# Patient Record
Sex: FEMALE | Race: WHITE | Employment: FULL TIME | URBAN - NONMETROPOLITAN AREA
[De-identification: names, ages, dates, MRNs, and addresses within clinical notes are randomized per-mention and may not be internally consistent; named-entity substitution may affect disease eponyms.]

---

## 2020-06-03 ENCOUNTER — HOSPITAL ENCOUNTER (EMERGENCY)
Age: 30
Discharge: HOME | End: 2020-06-03
Payer: MEDICAID

## 2020-06-03 VITALS
HEART RATE: 106 BPM | HEIGHT: 63 IN | OXYGEN SATURATION: 97 % | WEIGHT: 217 LBS | SYSTOLIC BLOOD PRESSURE: 120 MMHG | TEMPERATURE: 97.8 F | DIASTOLIC BLOOD PRESSURE: 73 MMHG | RESPIRATION RATE: 16 BRPM | BODY MASS INDEX: 38.45 KG/M2

## 2020-06-03 PROCEDURE — 99213 OFFICE O/P EST LOW 20 MIN: CPT

## 2020-06-03 PROCEDURE — 99213 OFFICE O/P EST LOW 20 MIN: CPT | Performed by: NURSE PRACTITIONER

## 2020-06-03 PROCEDURE — 6370000000 HC RX 637 (ALT 250 FOR IP): Performed by: NURSE PRACTITIONER

## 2020-06-03 RX ORDER — ONDANSETRON 4 MG/1
4 TABLET, ORALLY DISINTEGRATING ORAL EVERY 8 HOURS PRN
Qty: 15 TABLET | Refills: 0 | Status: SHIPPED | OUTPATIENT
Start: 2020-06-03

## 2020-06-03 RX ORDER — ONDANSETRON 4 MG/1
4 TABLET, ORALLY DISINTEGRATING ORAL ONCE
Status: COMPLETED | OUTPATIENT
Start: 2020-06-03 | End: 2020-06-03

## 2020-06-03 RX ADMIN — ONDANSETRON 4 MG: 4 TABLET, ORALLY DISINTEGRATING ORAL at 14:29

## 2020-06-03 ASSESSMENT — ENCOUNTER SYMPTOMS
NAUSEA: 1
ABDOMINAL PAIN: 0
BLOOD IN STOOL: 0
ANAL BLEEDING: 0
COUGH: 0
ABDOMINAL DISTENTION: 0
CONSTIPATION: 0
RHINORRHEA: 0
CHEST TIGHTNESS: 0
DIARRHEA: 1
APNEA: 0
STRIDOR: 0
VOMITING: 0
SINUS PAIN: 0
SORE THROAT: 0
WHEEZING: 0
CHOKING: 0
RECTAL PAIN: 0
SHORTNESS OF BREATH: 0
SINUS PRESSURE: 0

## 2020-06-04 ENCOUNTER — CARE COORDINATION (OUTPATIENT)
Dept: CARE COORDINATION | Age: 30
End: 2020-06-04

## 2020-06-05 NOTE — CARE COORDINATION
Author provided contact information for future reference. Patient/family/caregiver given information for [employer] and agrees to enroll yes  Patient's preferred e-mail: [email or web address]   Patient's preferred phone number: [phone]  Based on Loop alert triggers, patient will be contacted by nurse care manager for worsening symptoms. Spoke with pt on phone. Pt reports she is \"better. \" She filled Rx and is taking as prescribed. Educated pt on Covid Precautions and pt is compliant. 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 \"  Pt was provided the Call Center #. Introduced LOOP an dpt agreed. Encouraged pt to contact her PCP with any new or worsening sx. Pt voiced understanding and was appreciative of the f/u call. Will resolve at this time.

## 2022-03-21 ENCOUNTER — HOSPITAL ENCOUNTER (EMERGENCY)
Age: 32
Discharge: HOME | End: 2022-03-21
Attending: EMERGENCY MEDICINE
Payer: MEDICARE

## 2022-03-21 VITALS
HEIGHT: 62 IN | DIASTOLIC BLOOD PRESSURE: 83 MMHG | RESPIRATION RATE: 18 BRPM | OXYGEN SATURATION: 98 % | SYSTOLIC BLOOD PRESSURE: 120 MMHG | BODY MASS INDEX: 38.64 KG/M2 | WEIGHT: 210 LBS | TEMPERATURE: 98.1 F | HEART RATE: 101 BPM

## 2022-03-21 DIAGNOSIS — T30.0 ERYTHEMA DUE TO BURN (FIRST DEGREE): Primary | ICD-10-CM

## 2022-03-21 PROCEDURE — 90714 TD VACC NO PRESV 7 YRS+ IM: CPT | Performed by: EMERGENCY MEDICINE

## 2022-03-21 PROCEDURE — 6370000000 HC RX 637 (ALT 250 FOR IP): Performed by: EMERGENCY MEDICINE

## 2022-03-21 PROCEDURE — 99283 EMERGENCY DEPT VISIT LOW MDM: CPT

## 2022-03-21 PROCEDURE — 90471 IMMUNIZATION ADMIN: CPT | Performed by: EMERGENCY MEDICINE

## 2022-03-21 PROCEDURE — 6360000002 HC RX W HCPCS: Performed by: EMERGENCY MEDICINE

## 2022-03-21 PROCEDURE — 16000 INITIAL TREATMENT OF BURN(S): CPT

## 2022-03-21 RX ORDER — HYDROCODONE BITARTRATE AND ACETAMINOPHEN 5; 325 MG/1; MG/1
1 TABLET ORAL ONCE
Status: COMPLETED | OUTPATIENT
Start: 2022-03-21 | End: 2022-03-21

## 2022-03-21 RX ORDER — CEPHALEXIN 500 MG/1
500 CAPSULE ORAL 4 TIMES DAILY
Qty: 40 CAPSULE | Refills: 0 | Status: SHIPPED | OUTPATIENT
Start: 2022-03-21 | End: 2022-03-31

## 2022-03-21 RX ORDER — CEPHALEXIN 250 MG/1
500 CAPSULE ORAL ONCE
Status: COMPLETED | OUTPATIENT
Start: 2022-03-21 | End: 2022-03-21

## 2022-03-21 RX ORDER — GINSENG 100 MG
CAPSULE ORAL 3 TIMES DAILY
Status: DISCONTINUED | OUTPATIENT
Start: 2022-03-21 | End: 2022-03-22 | Stop reason: HOSPADM

## 2022-03-21 RX ORDER — HYDROCODONE BITARTRATE AND ACETAMINOPHEN 5; 325 MG/1; MG/1
1 TABLET ORAL EVERY 6 HOURS PRN
Qty: 12 TABLET | Refills: 0 | Status: SHIPPED | OUTPATIENT
Start: 2022-03-21 | End: 2022-03-24

## 2022-03-21 RX ADMIN — HYDROCODONE BITARTRATE AND ACETAMINOPHEN 1 TABLET: 5; 325 TABLET ORAL at 22:19

## 2022-03-21 RX ADMIN — CLOSTRIDIUM TETANI TOXOID ANTIGEN (FORMALDEHYDE INACTIVATED) AND CORYNEBACTERIUM DIPHTHERIAE TOXOID ANTIGEN (FORMALDEHYDE INACTIVATED) 0.5 ML: 5; 2 INJECTION, SUSPENSION INTRAMUSCULAR at 22:20

## 2022-03-21 RX ADMIN — CEPHALEXIN 500 MG: 250 CAPSULE ORAL at 22:19

## 2022-03-21 RX ADMIN — BACITRACIN: 500 OINTMENT TOPICAL at 22:20

## 2022-03-21 ASSESSMENT — ENCOUNTER SYMPTOMS
RHINORRHEA: 0
CONSTIPATION: 0
SINUS PRESSURE: 0
VOICE CHANGE: 0
ABDOMINAL DISTENTION: 0
NAUSEA: 0
WHEEZING: 0
EYE DISCHARGE: 0
EYE ITCHING: 0
CHOKING: 0
VOMITING: 0
TROUBLE SWALLOWING: 0
SHORTNESS OF BREATH: 0
PHOTOPHOBIA: 0
EYE PAIN: 0
EYE REDNESS: 0
CHEST TIGHTNESS: 0
SORE THROAT: 0
ABDOMINAL PAIN: 0
BLOOD IN STOOL: 0
BACK PAIN: 0
DIARRHEA: 0
COUGH: 0

## 2022-03-21 ASSESSMENT — PAIN SCALES - GENERAL: PAINLEVEL_OUTOF10: 10

## 2022-03-21 ASSESSMENT — PAIN DESCRIPTION - DESCRIPTORS: DESCRIPTORS: THROBBING

## 2022-03-21 ASSESSMENT — PAIN DESCRIPTION - ORIENTATION: ORIENTATION: RIGHT

## 2022-03-21 ASSESSMENT — PAIN DESCRIPTION - FREQUENCY: FREQUENCY: CONTINUOUS

## 2022-03-21 ASSESSMENT — PAIN DESCRIPTION - PAIN TYPE: TYPE: ACUTE PAIN

## 2022-03-21 ASSESSMENT — PAIN - FUNCTIONAL ASSESSMENT: PAIN_FUNCTIONAL_ASSESSMENT: 0-10

## 2022-03-21 ASSESSMENT — PAIN DESCRIPTION - ONSET: ONSET: ON-GOING

## 2022-03-21 ASSESSMENT — PAIN DESCRIPTION - LOCATION: LOCATION: ARM;HAND
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
## 2024-08-26 ENCOUNTER — HOSPITAL ENCOUNTER (OUTPATIENT)
Dept: OCCUPATIONAL THERAPY | Age: 34
Setting detail: THERAPIES SERIES
Discharge: HOME | End: 2024-08-26
Payer: MEDICAID

## 2024-08-26 PROCEDURE — 97014 ELECTRIC STIMULATION THERAPY: CPT

## 2024-08-26 PROCEDURE — 97165 OT EVAL LOW COMPLEX 30 MIN: CPT

## 2024-08-26 PROCEDURE — 97110 THERAPEUTIC EXERCISES: CPT
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
## 2024-08-28 ENCOUNTER — HOSPITAL ENCOUNTER (OUTPATIENT)
Dept: OCCUPATIONAL THERAPY | Age: 34
Setting detail: THERAPIES SERIES
Discharge: HOME | End: 2024-08-28
Payer: MEDICAID

## 2024-08-28 PROCEDURE — 97110 THERAPEUTIC EXERCISES: CPT

## 2024-08-28 NOTE — PROGRESS NOTES
negative.     SUBJECTIVE: Patient states she has been completing her HEP at home. She feels like she has better movement and less pain this date. Pt late to appt this date.       TREATMENT   Precautions: general precautions    Pain: 7/10 top of shoulder, anterior     “X” in shaded column indicates Activity Completed Today   Modalities Parameters/  Location  Notes/Comments   Ice with IFC stim at end of session for pain reduction -  pps, 40% scan, 12 minutes, intensity 5.                  Manual Therapy Time/  Technique  Notes/Comments   STM in scap area, along trap and pec area  x Increased soreness and tightness in pec area    Scap mobs in sidelying   x          Exercises   Sets/  Sec Reps  Notes/Comments   Sleeper stretch 30 sec 3     Supine dowel flexion 1 10 X    Supine dowel ER 1 10     Standing IR stretch using other hand to assist. 10 sec 5     Sidelying stretch un extension and IR  10 sec 5 each x                  Activities Time    Notes/Comments                       Specific Interventions Next Treatment: ROM, RC/scapular strengthening, IFC stim with ice PRN for pain/inflammation.    Activity/Treatment Tolerance:  [x]  Patient tolerated treatment well  []  Patient limited by fatigue  []  Patient limited by pain   []  Patient limited by other medical complications  []  Other:     Assessment: Angeles presents for her first skilled OT visit. Pt arriving late to appt this date. Pt with tightness in trap and pec muscle and increased pain on anterior shoulder with stretching   Areas for Improvement: impaired ROM, impaired strength, and pain  Prognosis: good    GOALS:  Patient Goal: be able to sleep with less pain and difficulty    Short Term Goals:  Time Frame: 10 visits  Patient will improve AROM R shoulder flexion to 130, abduction to 130, ER to 65, IR thumb tip to 2\" below bra line for improved ease with dressing and reaching.  Patient will report pain with performing hair care no more than 3/10.  Patient  will demonstrate I knowledge of HEP for improved flexibility and strength for lifting overhead.    Long Term Goals:  Time Frame: 8 weeks  Patient will improve SPADI to 80.  Patient will report ability to sleep with no more than minimal difficulty.  Patient will report ability to reach overhead into cabinets with no more than minimal difficulty.    Patient Education:   []  HEP/Education Completed: Plan of Care, Goals, HEP  [employer] Access Code for HEP: Access Code: [email or web address]    - Sleeper Stretch (Mirrored)  - 3 x daily - 7 x weekly - 1 sets - 3-5 reps - 15-30 hold  - Supine Shoulder Flexion AAROM with Dowel  - 3 x daily - 7 x weekly - 1 sets - 10 reps - 5-10 hold  - Supine Shoulder External Rotation with Dowel at 20 Degrees of Abduction  - 3 x daily - 7 x weekly - 1 sets - 10 reps - 5-10 hold  - Standing Shoulder Internal Rotation Stretch with Hands Behind Back  - 3 x daily - 7 x weekly - 1 sets - 10 reps - 5-10 hold  []  No new Education completed  [x]  Reviewed Prior HEP      [x]  Patient verbalized and/or demonstrated understanding of education provided.  []  Patient unable to verbalize and/or demonstrate understanding of education provided.  Will continue education.  [x]  Barriers to learning: none    PLAN:  Treatment Recommendations: Strengthening, Range of Motion, Manual Therapy - Soft Tissue Mobilization, Manual Therapy - Joint Manipulation, Pain Management, Home Exercise Program, Patient Education, and Modalities    [x]  Plan of care initiated.  Plan to see patient 2 times per week for 8 weeks to address the treatment planned outlined above.  []  Continue with current plan of care  []  Modify plan of care as follows:    []  Hold pending physician visit  []  Discharge    Time In 0841   Time Out 0900   Timed Code Minutes: 19 min   Total Treatment Time: 19 min       Electronically Signed by:  Heather WRIGHT OTR/L 2013

## 2024-09-04 ENCOUNTER — APPOINTMENT (OUTPATIENT)
Dept: OCCUPATIONAL THERAPY | Age: 34
End: 2024-09-04
Payer: MEDICAID

## 2024-09-09 ENCOUNTER — HOSPITAL ENCOUNTER (OUTPATIENT)
Dept: OCCUPATIONAL THERAPY | Age: 34
Setting detail: THERAPIES SERIES
Discharge: HOME | End: 2024-09-09
Payer: MEDICAID

## 2024-09-09 PROCEDURE — 97014 ELECTRIC STIMULATION THERAPY: CPT

## 2024-09-09 PROCEDURE — 97110 THERAPEUTIC EXERCISES: CPT

## 2024-09-12 ENCOUNTER — HOSPITAL ENCOUNTER (OUTPATIENT)
Dept: OCCUPATIONAL THERAPY | Age: 34
Setting detail: THERAPIES SERIES
Discharge: HOME | End: 2024-09-12
Payer: MEDICAID

## 2024-09-12 PROCEDURE — 97014 ELECTRIC STIMULATION THERAPY: CPT

## 2024-09-12 PROCEDURE — 97110 THERAPEUTIC EXERCISES: CPT

## 2024-09-16 ENCOUNTER — APPOINTMENT (OUTPATIENT)
Dept: OCCUPATIONAL THERAPY | Age: 34
End: 2024-09-16
Payer: MEDICAID

## 2024-09-19 ENCOUNTER — APPOINTMENT (OUTPATIENT)
Dept: OCCUPATIONAL THERAPY | Age: 34
End: 2024-09-19
Payer: MEDICAID

## 2024-09-23 ENCOUNTER — HOSPITAL ENCOUNTER (OUTPATIENT)
Dept: OCCUPATIONAL THERAPY | Age: 34
Setting detail: THERAPIES SERIES
Discharge: HOME | End: 2024-09-23
Payer: MEDICAID

## 2024-09-23 PROCEDURE — 97014 ELECTRIC STIMULATION THERAPY: CPT

## 2024-09-23 PROCEDURE — 97110 THERAPEUTIC EXERCISES: CPT

## 2024-09-26 ENCOUNTER — HOSPITAL ENCOUNTER (OUTPATIENT)
Dept: OCCUPATIONAL THERAPY | Age: 34
Setting detail: THERAPIES SERIES
Discharge: HOME | End: 2024-09-26
Payer: MEDICAID

## 2024-09-26 PROCEDURE — 97110 THERAPEUTIC EXERCISES: CPT

## 2024-09-26 PROCEDURE — 97014 ELECTRIC STIMULATION THERAPY: CPT

## 2024-09-30 ENCOUNTER — APPOINTMENT (OUTPATIENT)
Dept: OCCUPATIONAL THERAPY | Age: 34
End: 2024-09-30
Payer: MEDICAID

## 2024-10-02 ENCOUNTER — APPOINTMENT (OUTPATIENT)
Dept: OCCUPATIONAL THERAPY | Age: 34
End: 2024-10-02
Payer: MEDICAID

## 2024-10-03 ENCOUNTER — APPOINTMENT (OUTPATIENT)
Dept: OCCUPATIONAL THERAPY | Age: 34
End: 2024-10-03
Payer: MEDICAID

## 2024-10-07 ENCOUNTER — HOSPITAL ENCOUNTER (OUTPATIENT)
Dept: OCCUPATIONAL THERAPY | Age: 34
Setting detail: THERAPIES SERIES
Discharge: HOME | End: 2024-10-07
Payer: MEDICAID

## 2024-10-07 PROCEDURE — 97014 ELECTRIC STIMULATION THERAPY: CPT

## 2024-10-07 PROCEDURE — 97110 THERAPEUTIC EXERCISES: CPT

## 2024-10-07 NOTE — PROGRESS NOTES
were negative.     SUBJECTIVE: Patient reports she continues to get better, shoulder just bothers her if she has a busy day or is doing a lot of work.      TREATMENT   Precautions: general precautions    Pain: \"just a little\"     “X” in shaded column indicates Activity Completed Today   Modalities Parameters/  Location  Notes/Comments   Ice with IFC stim at end of session for pain reduction -  pps, 40% scan, 12 minutes, intensity 12.  X Patient reports this helps her pain.               Manual Therapy Time/  Technique  Notes/Comments   STM in scap area, along trap and pec area   Increased soreness and tightness in pec area    Scap mobs in sidelying             Exercises   Sets/  Sec Reps  Notes/Comments   Sleeper stretch 30 sec 3 X    Standing pec minor stretch in corner 30 sec 3  Switch to standing in corner, d/t c/o hips hurting when supine on towel roll   pulley 1 15 X    Supine dowel flexion 1 10     Supine dowel ER 1 10     Standing IR stretch using other hand to assist. 10 sec 5     Sidelying stretch un extension and IR  10 sec 5 each     Sidelying ER 2# 1 20  X    Supine flexion with 1#  1 20      Supine 2# scapular punch, circles cw/ccw 1 20 ea X    Standing green band  Row  tricep  bicep 1 20 ea X    Supine green band   Diagonals  horiz abd 1 10 ea X                  Activities Time    Notes/Comments                       Specific Interventions Next Treatment: ROM, RC/scapular strengthening, IFC stim with ice PRN for pain/inflammation.    Activity/Treatment Tolerance:  [x]  Patient tolerated treatment well  []  Patient limited by fatigue  []  Patient limited by pain   []  Patient limited by other medical complications  []  Other:     Assessment: Progressing toward goals steadily.  Possible discharge next visit.  Will reassess next session.  Areas for Improvement: impaired ROM, impaired strength, and pain  Prognosis: good    GOALS:  Patient Goal: be able to sleep with less pain and difficulty    Short

## 2024-10-10 ENCOUNTER — HOSPITAL ENCOUNTER (OUTPATIENT)
Dept: OCCUPATIONAL THERAPY | Age: 34
Setting detail: THERAPIES SERIES
Discharge: HOME | End: 2024-10-10
Payer: MEDICAID

## 2024-10-10 PROCEDURE — 97110 THERAPEUTIC EXERCISES: CPT
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
